# Patient Record
Sex: FEMALE | Race: WHITE | NOT HISPANIC OR LATINO | URBAN - METROPOLITAN AREA
[De-identification: names, ages, dates, MRNs, and addresses within clinical notes are randomized per-mention and may not be internally consistent; named-entity substitution may affect disease eponyms.]

---

## 2017-07-20 ENCOUNTER — APPOINTMENT (OUTPATIENT)
Dept: URBAN - METROPOLITAN AREA SURGERY 9 | Age: 52
Setting detail: DERMATOLOGY
End: 2017-07-20

## 2017-07-20 DIAGNOSIS — Z41.9 ENCOUNTER FOR PROCEDURE FOR PURPOSES OTHER THAN REMEDYING HEALTH STATE, UNSPECIFIED: ICD-10-CM

## 2017-07-20 PROCEDURE — OTHER MEDICAL CONSULTATION: PRODUCTS: OTHER

## 2017-08-04 ENCOUNTER — APPOINTMENT (OUTPATIENT)
Dept: URBAN - METROPOLITAN AREA SURGERY 9 | Age: 52
Setting detail: DERMATOLOGY
End: 2017-09-05

## 2017-08-04 DIAGNOSIS — Z41.9 ENCOUNTER FOR PROCEDURE FOR PURPOSES OTHER THAN REMEDYING HEALTH STATE, UNSPECIFIED: ICD-10-CM

## 2017-08-04 PROCEDURE — OTHER FACIAL: OTHER

## 2017-08-04 ASSESSMENT — LOCATION SIMPLE DESCRIPTION DERM: LOCATION SIMPLE: LEFT FOREHEAD

## 2017-08-04 ASSESSMENT — LOCATION ZONE DERM: LOCATION ZONE: FACE

## 2017-08-04 ASSESSMENT — LOCATION DETAILED DESCRIPTION DERM: LOCATION DETAILED: LEFT FOREHEAD

## 2017-08-04 NOTE — PROCEDURE: FACIAL
Price (Use Numbers Only, No Special Characters Or $): 90.00
Detail Level: Zone
Facial Steaming: steamed
Extraction Method: extractor
Exfoliation Type: enzyme
Exfoliation Type Override: coconut
Treatment Type (Optional): Deep Cleanse Treatment
Comments (Non-Sticky): Tx gel , calming mask, ultra sheer, Elta Md spf clear

## 2018-10-22 ENCOUNTER — APPOINTMENT (OUTPATIENT)
Dept: URBAN - METROPOLITAN AREA SURGERY 6 | Age: 53
Setting detail: DERMATOLOGY
End: 2018-10-22

## 2018-10-22 DIAGNOSIS — D22 MELANOCYTIC NEVI: ICD-10-CM

## 2018-10-22 DIAGNOSIS — I78.8 OTHER DISEASES OF CAPILLARIES: ICD-10-CM

## 2018-10-22 DIAGNOSIS — Z85.828 PERSONAL HISTORY OF OTHER MALIGNANT NEOPLASM OF SKIN: ICD-10-CM

## 2018-10-22 DIAGNOSIS — L73.8 OTHER SPECIFIED FOLLICULAR DISORDERS: ICD-10-CM

## 2018-10-22 DIAGNOSIS — L91.8 OTHER HYPERTROPHIC DISORDERS OF THE SKIN: ICD-10-CM

## 2018-10-22 DIAGNOSIS — L82.1 OTHER SEBORRHEIC KERATOSIS: ICD-10-CM

## 2018-10-22 PROBLEM — D23.39 OTHER BENIGN NEOPLASM OF SKIN OF OTHER PARTS OF FACE: Status: ACTIVE | Noted: 2018-10-22

## 2018-10-22 PROBLEM — D22.5 MELANOCYTIC NEVI OF TRUNK: Status: ACTIVE | Noted: 2018-10-22

## 2018-10-22 PROCEDURE — 99213 OFFICE O/P EST LOW 20 MIN: CPT

## 2018-10-22 PROCEDURE — OTHER REASSURANCE: OTHER

## 2018-10-22 PROCEDURE — OTHER COUNSELING: OTHER

## 2018-10-22 PROCEDURE — OTHER RECOMMENDATIONS: OTHER

## 2018-10-22 ASSESSMENT — LOCATION DETAILED DESCRIPTION DERM
LOCATION DETAILED: LEFT NASAL ALAR GROOVE
LOCATION DETAILED: LEFT ANTERIOR PROXIMAL THIGH
LOCATION DETAILED: LEFT AXILLARY VAULT
LOCATION DETAILED: LEFT BUTTOCK
LOCATION DETAILED: LEFT MEDIAL UPPER BACK
LOCATION DETAILED: RIGHT NASAL ALAR GROOVE
LOCATION DETAILED: RIGHT FOREHEAD

## 2018-10-22 ASSESSMENT — LOCATION ZONE DERM
LOCATION ZONE: AXILLAE
LOCATION ZONE: FACE
LOCATION ZONE: LEG
LOCATION ZONE: TRUNK
LOCATION ZONE: NOSE

## 2018-10-22 ASSESSMENT — LOCATION SIMPLE DESCRIPTION DERM
LOCATION SIMPLE: BUTTOCK
LOCATION SIMPLE: RIGHT NOSE
LOCATION SIMPLE: RIGHT FOREHEAD
LOCATION SIMPLE: LEFT THIGH
LOCATION SIMPLE: LEFT UPPER BACK
LOCATION SIMPLE: LEFT NOSE
LOCATION SIMPLE: LEFT AXILLARY VAULT

## 2018-10-22 NOTE — PROCEDURE: REASSURANCE
Detail Level: Detailed
Detail Level: Simple
Hide Include Location In Plan Question?: No
Include Location In Plan?: Yes
Detail Level: Generalized

## 2018-10-22 NOTE — PROCEDURE: RECOMMENDATIONS
Recommendation Preamble: The following recommendations were made during the visit: laser consult with ENOCH Perdomo MD. Consultation fee discussed.
Detail Level: Zone

## 2018-11-08 ENCOUNTER — APPOINTMENT (OUTPATIENT)
Dept: URBAN - METROPOLITAN AREA SURGERY 9 | Age: 53
Setting detail: DERMATOLOGY
End: 2018-11-08

## 2018-11-08 DIAGNOSIS — I78.8 OTHER DISEASES OF CAPILLARIES: ICD-10-CM

## 2018-11-08 DIAGNOSIS — Z41.9 ENCOUNTER FOR PROCEDURE FOR PURPOSES OTHER THAN REMEDYING HEALTH STATE, UNSPECIFIED: ICD-10-CM

## 2018-11-08 PROCEDURE — OTHER COSMETIC CONSULTATION: FILLERS: OTHER

## 2018-11-08 PROCEDURE — OTHER COSMETIC CONSULTATION: RED SPOTS: OTHER

## 2018-11-08 PROCEDURE — OTHER COSMETIC CONSULTATION - RED SPOTS: OTHER

## 2019-01-28 ENCOUNTER — APPOINTMENT (OUTPATIENT)
Dept: URBAN - METROPOLITAN AREA SURGERY 9 | Age: 54
Setting detail: DERMATOLOGY
End: 2019-01-28

## 2019-01-28 DIAGNOSIS — L71.8 OTHER ROSACEA: ICD-10-CM

## 2019-01-28 DIAGNOSIS — I78.8 OTHER DISEASES OF CAPILLARIES: ICD-10-CM

## 2019-01-28 PROCEDURE — OTHER OTHER: OTHER

## 2019-01-28 PROCEDURE — OTHER EXCEL V LASER: OTHER

## 2019-01-28 ASSESSMENT — LOCATION SIMPLE DESCRIPTION DERM
LOCATION SIMPLE: CHIN
LOCATION SIMPLE: RIGHT CHEEK
LOCATION SIMPLE: NOSE
LOCATION SIMPLE: LEFT CHEEK

## 2019-01-28 ASSESSMENT — LOCATION DETAILED DESCRIPTION DERM
LOCATION DETAILED: NASAL SUPRATIP
LOCATION DETAILED: RIGHT INFERIOR CENTRAL MALAR CHEEK
LOCATION DETAILED: RIGHT CHIN
LOCATION DETAILED: LEFT INFERIOR CENTRAL MALAR CHEEK
LOCATION DETAILED: LEFT CHIN

## 2019-01-28 ASSESSMENT — LOCATION ZONE DERM
LOCATION ZONE: NOSE
LOCATION ZONE: FACE

## 2019-01-28 NOTE — PROCEDURE: EXCEL V LASER
Price (Use Numbers Only, No Special Characters Or $): 623 Price (Use Numbers Only, No Special Characters Or $): 797

## 2019-01-28 NOTE — PROCEDURE: OTHER
Note Text (......Xxx Chief Complaint.): This diagnosis correlates with the
Detail Level: Detailed
Other (Free Text): Treatment #1\\nCost $600
Other (Free Text): Treatment #1\\nCost included with above

## 2020-01-06 ENCOUNTER — APPOINTMENT (OUTPATIENT)
Dept: URBAN - METROPOLITAN AREA SURGERY 9 | Age: 55
Setting detail: DERMATOLOGY
End: 2020-01-06

## 2020-01-06 DIAGNOSIS — D22 MELANOCYTIC NEVI: ICD-10-CM

## 2020-01-06 DIAGNOSIS — Z85.828 PERSONAL HISTORY OF OTHER MALIGNANT NEOPLASM OF SKIN: ICD-10-CM

## 2020-01-06 DIAGNOSIS — L71.8 OTHER ROSACEA: ICD-10-CM

## 2020-01-06 DIAGNOSIS — L82.1 OTHER SEBORRHEIC KERATOSIS: ICD-10-CM

## 2020-01-06 DIAGNOSIS — R20.2 PARESTHESIA OF SKIN: ICD-10-CM

## 2020-01-06 PROBLEM — D22.5 MELANOCYTIC NEVI OF TRUNK: Status: ACTIVE | Noted: 2020-01-06

## 2020-01-06 PROCEDURE — OTHER COUNSELING: OTHER

## 2020-01-06 PROCEDURE — 99213 OFFICE O/P EST LOW 20 MIN: CPT

## 2020-01-06 PROCEDURE — OTHER REASSURANCE: OTHER

## 2020-01-06 PROCEDURE — OTHER TREATMENT REGIMEN: OTHER

## 2020-01-06 ASSESSMENT — LOCATION SIMPLE DESCRIPTION DERM
LOCATION SIMPLE: UPPER BACK
LOCATION SIMPLE: RIGHT UPPER BACK
LOCATION SIMPLE: LEFT THIGH
LOCATION SIMPLE: NOSE
LOCATION SIMPLE: BUTTOCK

## 2020-01-06 ASSESSMENT — LOCATION DETAILED DESCRIPTION DERM
LOCATION DETAILED: NASAL SUPRATIP
LOCATION DETAILED: LEFT ANTERIOR LATERAL PROXIMAL THIGH
LOCATION DETAILED: LEFT BUTTOCK
LOCATION DETAILED: INFERIOR THORACIC SPINE
LOCATION DETAILED: RIGHT SUPERIOR UPPER BACK

## 2020-01-06 ASSESSMENT — SEVERITY ASSESSMENT OVERALL AMONG ALL PATIENTS: IN YOUR EXPERIENCE, AMONG ALL PATIENTS YOU HAVE SEEN WITH THIS CONDITION, HOW SEVERE IS THIS PATIENT'S CONDITION?: MILD

## 2020-01-06 ASSESSMENT — LOCATION ZONE DERM
LOCATION ZONE: TRUNK
LOCATION ZONE: LEG
LOCATION ZONE: NOSE

## 2020-01-06 NOTE — PROCEDURE: TREATMENT REGIMEN
Plan: Declines Rx treatment
Otc Regimen: Hydrocortisone 1% or anti itch lotions such as Sarna as needed
Detail Level: Simple
Detail Level: Zone
Plan: Consider metronidazole cream. Few small pimple like papules on nose. Can consider Rhofade for persistent redness. Patient declines Rx treatment today

## 2021-04-06 ENCOUNTER — APPOINTMENT (OUTPATIENT)
Dept: URBAN - METROPOLITAN AREA SURGERY 9 | Age: 56
Setting detail: DERMATOLOGY
End: 2021-04-06

## 2021-04-06 DIAGNOSIS — L82.1 OTHER SEBORRHEIC KERATOSIS: ICD-10-CM

## 2021-04-06 DIAGNOSIS — L91.8 OTHER HYPERTROPHIC DISORDERS OF THE SKIN: ICD-10-CM

## 2021-04-06 DIAGNOSIS — Z85.828 PERSONAL HISTORY OF OTHER MALIGNANT NEOPLASM OF SKIN: ICD-10-CM

## 2021-04-06 DIAGNOSIS — R20.2 PARESTHESIA OF SKIN: ICD-10-CM

## 2021-04-06 DIAGNOSIS — L71.8 OTHER ROSACEA: ICD-10-CM

## 2021-04-06 DIAGNOSIS — D22 MELANOCYTIC NEVI: ICD-10-CM

## 2021-04-06 PROBLEM — D22.5 MELANOCYTIC NEVI OF TRUNK: Status: ACTIVE | Noted: 2021-04-06

## 2021-04-06 PROCEDURE — OTHER REASSURANCE: OTHER

## 2021-04-06 PROCEDURE — OTHER SUNSCREEN RECOMMENDATIONS: OTHER

## 2021-04-06 PROCEDURE — OTHER TREATMENT REGIMEN: OTHER

## 2021-04-06 PROCEDURE — OTHER COUNSELING: OTHER

## 2021-04-06 PROCEDURE — OTHER DEFER: OTHER

## 2021-04-06 PROCEDURE — 99213 OFFICE O/P EST LOW 20 MIN: CPT

## 2021-04-06 ASSESSMENT — LOCATION DETAILED DESCRIPTION DERM
LOCATION DETAILED: NASAL SUPRATIP
LOCATION DETAILED: LEFT AXILLARY VAULT
LOCATION DETAILED: LEFT BUTTOCK
LOCATION DETAILED: RIGHT SUPERIOR UPPER BACK
LOCATION DETAILED: PERIUMBILICAL SKIN
LOCATION DETAILED: LEFT PROXIMAL LATERAL POSTERIOR THIGH

## 2021-04-06 ASSESSMENT — LOCATION ZONE DERM
LOCATION ZONE: TRUNK
LOCATION ZONE: AXILLAE
LOCATION ZONE: NOSE
LOCATION ZONE: LEG

## 2021-04-06 ASSESSMENT — SEVERITY ASSESSMENT OVERALL AMONG ALL PATIENTS
IN YOUR EXPERIENCE, AMONG ALL PATIENTS YOU HAVE SEEN WITH THIS CONDITION, HOW SEVERE IS THIS PATIENT'S CONDITION?: MINIMAL

## 2021-04-06 ASSESSMENT — LOCATION SIMPLE DESCRIPTION DERM
LOCATION SIMPLE: BUTTOCK
LOCATION SIMPLE: LEFT AXILLARY VAULT
LOCATION SIMPLE: NOSE
LOCATION SIMPLE: RIGHT UPPER BACK
LOCATION SIMPLE: ABDOMEN
LOCATION SIMPLE: LEFT POSTERIOR THIGH

## 2021-04-06 NOTE — PROCEDURE: DEFER
Reason To Defer Override: pt would like to pursue Chiropractic visit before Rx Tx.
Detail Level: Detailed
Introduction Text (Please End With A Colon): The following procedure was deferred:

## 2021-04-06 NOTE — PROCEDURE: SUNSCREEN RECOMMENDATIONS
General Sunscreen Counseling: I recommended a broad spectrum sunscreen with a SPF of 30 or higher. If swimming or exercising sunscreen should be reapplied every 45 minutes to an hour after getting wet or sweating.   Sun protective clothing can be used in lieu of sunscreen but must be worn the entire time you are exposed to the sun's rays.
Detail Level: Generalized

## 2021-04-06 NOTE — PROCEDURE: REASSURANCE
Hide Additional Notes?: No
Detail Level: Generalized
Include Location In Plan?: Yes
Detail Level: Detailed
Detail Level: Simple
Detail Level: Zone

## 2021-04-06 NOTE — PROCEDURE: TREATMENT REGIMEN
Detail Level: Zone
Plan: Consider metronidazole cream. Few small pimple like papules on nose. Can consider Rhofade for persistent redness. Patient declines Rx treatment today

## 2022-04-06 ENCOUNTER — APPOINTMENT (OUTPATIENT)
Dept: URBAN - METROPOLITAN AREA SURGERY 9 | Age: 57
Setting detail: DERMATOLOGY
End: 2022-04-06

## 2022-04-06 DIAGNOSIS — L82.1 OTHER SEBORRHEIC KERATOSIS: ICD-10-CM

## 2022-04-06 DIAGNOSIS — D22 MELANOCYTIC NEVI: ICD-10-CM

## 2022-04-06 DIAGNOSIS — Z85.828 PERSONAL HISTORY OF OTHER MALIGNANT NEOPLASM OF SKIN: ICD-10-CM

## 2022-04-06 PROBLEM — D22.5 MELANOCYTIC NEVI OF TRUNK: Status: ACTIVE | Noted: 2022-04-06

## 2022-04-06 PROCEDURE — OTHER REASSURANCE: OTHER

## 2022-04-06 PROCEDURE — OTHER SUNSCREEN RECOMMENDATIONS: OTHER

## 2022-04-06 PROCEDURE — 99213 OFFICE O/P EST LOW 20 MIN: CPT

## 2022-04-06 ASSESSMENT — LOCATION ZONE DERM
LOCATION ZONE: TRUNK
LOCATION ZONE: LEG

## 2022-04-06 ASSESSMENT — LOCATION SIMPLE DESCRIPTION DERM
LOCATION SIMPLE: LEFT POSTERIOR THIGH
LOCATION SIMPLE: ABDOMEN
LOCATION SIMPLE: BUTTOCK

## 2022-04-06 ASSESSMENT — LOCATION DETAILED DESCRIPTION DERM
LOCATION DETAILED: PERIUMBILICAL SKIN
LOCATION DETAILED: LEFT BUTTOCK
LOCATION DETAILED: LEFT PROXIMAL LATERAL POSTERIOR THIGH

## 2022-07-08 NOTE — PROCEDURE: EXCEL V LASER
7-8-22  Reason for Call:  Request for results:    Name of test or procedure: lab    Date of test of procedure: 7-7-22    Location of the test or procedure: [facility] to leave the result message on voice mail or with a family member? YES    Phone number Patient can be reached at:  Cell number on file:    Telephone Information:   Mobile [phone]       Additional comments: pt called requested her lab resuls from 7-7-22 ,pt is aware some of the lab results are not back yet, but  Would like the results on the labs that are back   Pt uses sign language interpretor     Call taken on 7/8/2022 at 9:05 AM by Екатерина Jackson   Laser Type: [id number]

## 2023-04-11 ENCOUNTER — APPOINTMENT (OUTPATIENT)
Dept: URBAN - METROPOLITAN AREA SURGERY 9 | Age: 58
Setting detail: DERMATOLOGY
End: 2023-04-11

## 2023-04-11 DIAGNOSIS — L82.1 OTHER SEBORRHEIC KERATOSIS: ICD-10-CM

## 2023-04-11 DIAGNOSIS — Z85.828 PERSONAL HISTORY OF OTHER MALIGNANT NEOPLASM OF SKIN: ICD-10-CM

## 2023-04-11 DIAGNOSIS — L67.8 OTHER HAIR COLOR AND HAIR SHAFT ABNORMALITIES: ICD-10-CM

## 2023-04-11 DIAGNOSIS — D22 MELANOCYTIC NEVI: ICD-10-CM

## 2023-04-11 PROBLEM — D22.5 MELANOCYTIC NEVI OF TRUNK: Status: ACTIVE | Noted: 2023-04-11

## 2023-04-11 PROCEDURE — 99213 OFFICE O/P EST LOW 20 MIN: CPT

## 2023-04-11 PROCEDURE — OTHER COUNSELING: OTHER

## 2023-04-11 PROCEDURE — OTHER SUNSCREEN RECOMMENDATIONS: OTHER

## 2023-04-11 PROCEDURE — OTHER MIPS QUALITY: OTHER

## 2023-04-11 PROCEDURE — OTHER REASSURANCE: OTHER

## 2023-04-11 ASSESSMENT — LOCATION SIMPLE DESCRIPTION DERM
LOCATION SIMPLE: ABDOMEN
LOCATION SIMPLE: BUTTOCK
LOCATION SIMPLE: LEFT POSTERIOR THIGH

## 2023-04-11 ASSESSMENT — LOCATION DETAILED DESCRIPTION DERM
LOCATION DETAILED: LEFT PROXIMAL LATERAL POSTERIOR THIGH
LOCATION DETAILED: PERIUMBILICAL SKIN
LOCATION DETAILED: LEFT BUTTOCK

## 2023-04-11 ASSESSMENT — LOCATION ZONE DERM
LOCATION ZONE: TRUNK
LOCATION ZONE: LEG

## 2023-04-11 NOTE — PROCEDURE: COUNSELING
Detail Level: Zone
Patient Specific Counseling (Will Not Stick From Patient To Patient): Patient currently taking Nutrafol.\\nEncouraged to try Olex.

## 2024-04-16 ENCOUNTER — APPOINTMENT (OUTPATIENT)
Dept: URBAN - METROPOLITAN AREA SURGERY 9 | Age: 59
Setting detail: DERMATOLOGY
End: 2024-04-16

## 2024-04-16 DIAGNOSIS — L72.0 EPIDERMAL CYST: ICD-10-CM

## 2024-04-16 DIAGNOSIS — D22 MELANOCYTIC NEVI: ICD-10-CM

## 2024-04-16 DIAGNOSIS — Z85.828 PERSONAL HISTORY OF OTHER MALIGNANT NEOPLASM OF SKIN: ICD-10-CM

## 2024-04-16 DIAGNOSIS — L91.8 OTHER HYPERTROPHIC DISORDERS OF THE SKIN: ICD-10-CM

## 2024-04-16 DIAGNOSIS — L82.1 OTHER SEBORRHEIC KERATOSIS: ICD-10-CM

## 2024-04-16 PROBLEM — D23.39 OTHER BENIGN NEOPLASM OF SKIN OF OTHER PARTS OF FACE: Status: ACTIVE | Noted: 2024-04-16

## 2024-04-16 PROBLEM — D22.5 MELANOCYTIC NEVI OF TRUNK: Status: ACTIVE | Noted: 2024-04-16

## 2024-04-16 PROCEDURE — OTHER REASSURANCE: OTHER

## 2024-04-16 PROCEDURE — 99213 OFFICE O/P EST LOW 20 MIN: CPT

## 2024-04-16 PROCEDURE — OTHER COUNSELING: OTHER

## 2024-04-16 PROCEDURE — OTHER MIPS QUALITY: OTHER

## 2024-04-16 PROCEDURE — OTHER SUNSCREEN RECOMMENDATIONS: OTHER

## 2024-04-16 ASSESSMENT — LOCATION DETAILED DESCRIPTION DERM
LOCATION DETAILED: PERIUMBILICAL SKIN
LOCATION DETAILED: LEFT PROXIMAL LATERAL POSTERIOR THIGH
LOCATION DETAILED: LEFT CLAVICULAR NECK
LOCATION DETAILED: RIGHT CLAVICULAR NECK
LOCATION DETAILED: LEFT SUPERIOR FOREHEAD
LOCATION DETAILED: LEFT INFERIOR LATERAL NECK
LOCATION DETAILED: LEFT BUTTOCK
LOCATION DETAILED: RIGHT LATERAL ABDOMEN

## 2024-04-16 ASSESSMENT — LOCATION ZONE DERM
LOCATION ZONE: FACE
LOCATION ZONE: LEG
LOCATION ZONE: TRUNK
LOCATION ZONE: NECK

## 2024-04-16 ASSESSMENT — LOCATION SIMPLE DESCRIPTION DERM
LOCATION SIMPLE: ABDOMEN
LOCATION SIMPLE: RIGHT ANTERIOR NECK
LOCATION SIMPLE: LEFT FOREHEAD
LOCATION SIMPLE: LEFT POSTERIOR THIGH
LOCATION SIMPLE: BUTTOCK
LOCATION SIMPLE: LEFT ANTERIOR NECK

## 2024-04-16 NOTE — PROCEDURE: COUNSELING
Detail Level: Simple
Patient Specific Counseling (Will Not Stick From Patient To Patient): Requesting cosmetic removal at a later date due to attending wedding this weekend.

## 2024-04-16 NOTE — PROCEDURE: REASSURANCE
Detail Level: Detailed
Hide Additional Notes?: No
Detail Level: Generalized
Include Location In Plan?: Yes
Detail Level: Simple
Additional Notes (Optional): Defer removal today due to wedding this weekend.

## 2024-05-09 ENCOUNTER — APPOINTMENT (OUTPATIENT)
Dept: URBAN - METROPOLITAN AREA SURGERY 9 | Age: 59
Setting detail: DERMATOLOGY
End: 2024-05-09

## 2024-05-09 DIAGNOSIS — Z41.9 ENCOUNTER FOR PROCEDURE FOR PURPOSES OTHER THAN REMEDYING HEALTH STATE, UNSPECIFIED: ICD-10-CM

## 2024-05-09 PROCEDURE — OTHER COSMETIC CONSULTATION: FACIAL: OTHER

## 2024-05-09 ASSESSMENT — LOCATION SIMPLE DESCRIPTION DERM: LOCATION SIMPLE: LEFT CHEEK

## 2024-05-09 ASSESSMENT — LOCATION ZONE DERM: LOCATION ZONE: FACE

## 2024-05-09 ASSESSMENT — LOCATION DETAILED DESCRIPTION DERM: LOCATION DETAILED: LEFT CENTRAL MALAR CHEEK

## 2024-06-28 ENCOUNTER — APPOINTMENT (OUTPATIENT)
Dept: URBAN - METROPOLITAN AREA SURGERY 9 | Age: 59
Setting detail: DERMATOLOGY
End: 2024-06-28

## 2024-06-28 DIAGNOSIS — Z41.9 ENCOUNTER FOR PROCEDURE FOR PURPOSES OTHER THAN REMEDYING HEALTH STATE, UNSPECIFIED: ICD-10-CM

## 2024-06-28 PROCEDURE — OTHER DERMAPLANE: OTHER

## 2024-06-28 PROCEDURE — OTHER FACIAL: OTHER

## 2024-06-28 PROCEDURE — OTHER COSMETIC EXTRACTIONS: OTHER

## 2024-06-28 ASSESSMENT — LOCATION DETAILED DESCRIPTION DERM
LOCATION DETAILED: RIGHT MEDIAL FOREHEAD
LOCATION DETAILED: RIGHT LATERAL FOREHEAD
LOCATION DETAILED: LEFT MEDIAL MALAR CHEEK
LOCATION DETAILED: RIGHT INFERIOR LATERAL FOREHEAD
LOCATION DETAILED: RIGHT INFERIOR FOREHEAD
LOCATION DETAILED: RIGHT INFERIOR MEDIAL FOREHEAD
LOCATION DETAILED: RIGHT MEDIAL MALAR CHEEK
LOCATION DETAILED: RIGHT FOREHEAD

## 2024-06-28 ASSESSMENT — LOCATION ZONE DERM: LOCATION ZONE: FACE

## 2024-06-28 ASSESSMENT — LOCATION SIMPLE DESCRIPTION DERM
LOCATION SIMPLE: RIGHT FOREHEAD
LOCATION SIMPLE: LEFT CHEEK
LOCATION SIMPLE: RIGHT CHEEK

## 2024-06-28 NOTE — PROCEDURE: FACIAL
Exfoliation Type: enzyme
Detail Level: Zone
Led Light (Optional): red
Facial Steaming: steamed
Extraction Method: extractor
Mask Type (Optional): cream based
Treatment Serum (Optional): Tanya-Antioxidant
Treatment Type (Optional): Anti Aging Facial

## 2024-06-28 NOTE — PROCEDURE: DERMAPLANE
Post-Procedure Instructions: Following the dermaplane procedure, Oxymist treatment was applied to the treatment areas. Moisturizer and SPF was applied.
Blade: [place] scalpel
Post-Care Instructions: I reviewed with the patient in detail post-care instructions.
Treatment Area Prep: acetone
Price (Use Numbers Only, No Special Characters Or $): 95.00
Treatment Areas: face and neck
Pre-Procedure Text: The patient was placed in a recumbant position on the procedure table.
Detail Level: Zone